# Patient Record
Sex: FEMALE | URBAN - METROPOLITAN AREA
[De-identification: names, ages, dates, MRNs, and addresses within clinical notes are randomized per-mention and may not be internally consistent; named-entity substitution may affect disease eponyms.]

---

## 2021-07-01 ENCOUNTER — ANESTHESIA EVENT (OUTPATIENT)
Dept: PERIOP | Facility: HOSPITAL | Age: 36
End: 2021-07-01
Payer: COMMERCIAL

## 2021-07-07 ENCOUNTER — ANESTHESIA (OUTPATIENT)
Dept: PERIOP | Facility: HOSPITAL | Age: 36
End: 2021-07-07
Payer: COMMERCIAL

## 2021-07-26 RX ORDER — MELATONIN
1000 DAILY
COMMUNITY

## 2021-07-26 RX ORDER — DIVALPROEX SODIUM 250 MG/1
250 TABLET, DELAYED RELEASE ORAL EVERY 12 HOURS SCHEDULED
COMMUNITY

## 2021-07-26 RX ORDER — NICOTINE POLACRILEX 2 MG
1 LOZENGE BUCCAL DAILY
COMMUNITY

## 2021-07-26 RX ORDER — TRAZODONE HYDROCHLORIDE 50 MG/1
50 TABLET ORAL
COMMUNITY

## 2021-07-26 RX ORDER — OXCARBAZEPINE 300 MG/1
300 TABLET, FILM COATED ORAL EVERY 12 HOURS SCHEDULED
COMMUNITY

## 2021-07-26 RX ORDER — RISPERIDONE 1 MG/ML
1.5 SOLUTION ORAL
COMMUNITY

## 2021-07-26 RX ORDER — ATOMOXETINE 40 MG/1
40 CAPSULE ORAL DAILY
COMMUNITY

## 2021-07-26 NOTE — PRE-PROCEDURE INSTRUCTIONS
My Surgical Experience    The following information was developed to assist you to prepare for your operation  What do I need to do before coming to the hospital?   Arrange for a responsible person to drive you to and from the hospital    Arrange care for your children at home  Children are not allowed in the recovery areas of the hospital   Plan to wear clothing that is easy to put on and take off  If you are having shoulder surgery, wear a shirt that buttons or zippers in the front  Bathing  o Shower the evening before and the morning of your surgery with an antibacterial soap  Please refer to the Pre Op Showering Instructions for Surgery Patients Sheet   o Remove nail polish and all body piercing jewelry  o Do not shave any body part for at least 24 hours before surgery-this includes face, arms, legs and upper body  Food  o Nothing to eat or drink after midnight the night before your surgery  This includes candy and chewing gum  o Exception: If your surgery is after 12:00pm (noon), you may have clear liquids such as 7-Up®, ginger ale, apple or cranberry juice, Jell-O®, water, or clear broth until 8:00 am  o Do not drink milk or juice with pulp on the morning before surgery  o Do not drink alcohol 24 hours before surgery  Medicine  o Follow instructions you received from your surgeon about which medicines you may take on the day of surgery  o If instructed to take medicine on the morning of surgery, take pills with just a small sip of water  Call your prescribing doctor for specific infroamtion on what to do if you take insulin    What should I bring to the hospital?    Bring:  Juliana Louie or a walker, if you have them, for foot or knee surgery   A list of the daily medicines, vitamins, minerals, herbals and nutritional supplements you take   Include the dosages of medicines and the time you take them each day   Glasses, dentures or hearing aids   Minimal clothing; you will be wearing hospital sleepwear   Photo ID; required to verify your identity   If you have a Living Will or Power of , bring a copy of the documents   If you have an ostomy, bring an extra pouch and any supplies you use    Do not bring   Medicines or inhalers   Money, valuables or jewelry    What other information should I know about the day of surgery?  Notify your surgeons if you develop a cold, sore throat, cough, fever, rash or any other illness   Report to the Ambulatory Surgical/Same Day Surgery Unit   You will be instructed to stop at Registration only if you have not been pre-registered   Inform your  fi they do not stay that they will be asked by the staff to leave a phone number where they can be reached   Be available to be reached before surgery  In the event the operating room schedule changes, you may be asked to come in earlier or later than expected    *It is important to tell your doctor and others involved in your health care if you are taking or have been taking any non-prescription drugs, vitamins, minerals, herbals or other nutritional supplements  Any of these may interact with some food or medicines and cause a reaction      Pre-Surgery Instructions:   Medication Instructions    atoMOXetine (STRATTERA) 40 mg capsule Instructed patient per Anesthesia Guidelines   cholecalciferol (VITAMIN D3) 1,000 units tablet Instructed patient per Anesthesia Guidelines   divalproex sodium (DEPAKOTE) 250 mg EC tablet Instructed patient per Anesthesia Guidelines   multivitamin-iron-minerals-folic acid (THERAPEUTIC-M) TABS tablet Instructed patient per Anesthesia Guidelines   OXcarbazepine (TRILEPTAL) 300 mg tablet Instructed patient per Anesthesia Guidelines   risperiDONE (RisperDAL) 1 mg/mL oral solution Instructed patient per Anesthesia Guidelines   traZODone (DESYREL) 50 mg tablet Instructed patient per Anesthesia Guidelines      triazolam (HALCION) 0 25 MG tablet Instructed patient per Anesthesia Guidelines  To take depakote, trileptal and triazolam a m  of surgery

## 2021-07-28 ENCOUNTER — HOSPITAL ENCOUNTER (OUTPATIENT)
Facility: HOSPITAL | Age: 36
Setting detail: OUTPATIENT SURGERY
Discharge: OTHER INSTITUTION | End: 2021-07-28
Attending: DENTIST | Admitting: DENTIST
Payer: COMMERCIAL

## 2021-07-28 ENCOUNTER — APPOINTMENT (OUTPATIENT)
Dept: RADIOLOGY | Facility: HOSPITAL | Age: 36
End: 2021-07-28
Payer: COMMERCIAL

## 2021-07-28 VITALS
TEMPERATURE: 97.7 F | HEART RATE: 88 BPM | HEIGHT: 60 IN | OXYGEN SATURATION: 99 % | WEIGHT: 100 LBS | RESPIRATION RATE: 20 BRPM | SYSTOLIC BLOOD PRESSURE: 108 MMHG | DIASTOLIC BLOOD PRESSURE: 52 MMHG | BODY MASS INDEX: 19.63 KG/M2

## 2021-07-28 PROBLEM — R56.9 SEIZURES (HCC): Status: ACTIVE | Noted: 2021-07-28

## 2021-07-28 RX ORDER — CHLORHEXIDINE GLUCONATE 0.12 MG/ML
RINSE ORAL AS NEEDED
Status: DISCONTINUED | OUTPATIENT
Start: 2021-07-28 | End: 2021-07-28 | Stop reason: HOSPADM

## 2021-07-28 RX ORDER — MAGNESIUM HYDROXIDE 1200 MG/15ML
LIQUID ORAL AS NEEDED
Status: DISCONTINUED | OUTPATIENT
Start: 2021-07-28 | End: 2021-07-28 | Stop reason: HOSPADM

## 2021-07-28 RX ORDER — PROPOFOL 10 MG/ML
INJECTION, EMULSION INTRAVENOUS AS NEEDED
Status: DISCONTINUED | OUTPATIENT
Start: 2021-07-28 | End: 2021-07-28

## 2021-07-28 RX ORDER — SODIUM CHLORIDE, SODIUM LACTATE, POTASSIUM CHLORIDE, CALCIUM CHLORIDE 600; 310; 30; 20 MG/100ML; MG/100ML; MG/100ML; MG/100ML
75 INJECTION, SOLUTION INTRAVENOUS CONTINUOUS
Status: DISCONTINUED | OUTPATIENT
Start: 2021-07-28 | End: 2021-07-28

## 2021-07-28 RX ORDER — ROCURONIUM BROMIDE 10 MG/ML
INJECTION, SOLUTION INTRAVENOUS AS NEEDED
Status: DISCONTINUED | OUTPATIENT
Start: 2021-07-28 | End: 2021-07-28

## 2021-07-28 RX ORDER — ONDANSETRON 2 MG/ML
4 INJECTION INTRAMUSCULAR; INTRAVENOUS ONCE AS NEEDED
Status: DISCONTINUED | OUTPATIENT
Start: 2021-07-28 | End: 2021-07-28 | Stop reason: HOSPADM

## 2021-07-28 RX ORDER — CLINDAMYCIN PHOSPHATE 600 MG/50ML
600 INJECTION INTRAVENOUS ONCE
Status: COMPLETED | OUTPATIENT
Start: 2021-07-28 | End: 2021-07-28

## 2021-07-28 RX ORDER — ONDANSETRON 2 MG/ML
INJECTION INTRAMUSCULAR; INTRAVENOUS AS NEEDED
Status: DISCONTINUED | OUTPATIENT
Start: 2021-07-28 | End: 2021-07-28

## 2021-07-28 RX ORDER — NEOSTIGMINE METHYLSULFATE 1 MG/ML
INJECTION INTRAVENOUS AS NEEDED
Status: DISCONTINUED | OUTPATIENT
Start: 2021-07-28 | End: 2021-07-28

## 2021-07-28 RX ORDER — SODIUM CHLORIDE, SODIUM LACTATE, POTASSIUM CHLORIDE, CALCIUM CHLORIDE 600; 310; 30; 20 MG/100ML; MG/100ML; MG/100ML; MG/100ML
100 INJECTION, SOLUTION INTRAVENOUS CONTINUOUS
Status: DISCONTINUED | OUTPATIENT
Start: 2021-07-28 | End: 2021-07-28 | Stop reason: HOSPADM

## 2021-07-28 RX ORDER — GLYCOPYRROLATE 0.2 MG/ML
INJECTION INTRAMUSCULAR; INTRAVENOUS AS NEEDED
Status: DISCONTINUED | OUTPATIENT
Start: 2021-07-28 | End: 2021-07-28

## 2021-07-28 RX ADMIN — NEOSTIGMINE METHYLSULFATE 3 MG: 1 INJECTION INTRAVENOUS at 08:32

## 2021-07-28 RX ADMIN — SODIUM CHLORIDE, SODIUM LACTATE, POTASSIUM CHLORIDE, AND CALCIUM CHLORIDE: .6; .31; .03; .02 INJECTION, SOLUTION INTRAVENOUS at 07:35

## 2021-07-28 RX ADMIN — PROPOFOL 50 MG: 10 INJECTION, EMULSION INTRAVENOUS at 07:44

## 2021-07-28 RX ADMIN — ONDANSETRON 4 MG: 2 INJECTION INTRAMUSCULAR; INTRAVENOUS at 07:58

## 2021-07-28 RX ADMIN — ROCURONIUM BROMIDE 30 MG: 10 INJECTION, SOLUTION INTRAVENOUS at 07:44

## 2021-07-28 RX ADMIN — GLYCOPYRROLATE 0.4 MG: 0.2 INJECTION, SOLUTION INTRAMUSCULAR; INTRAVENOUS at 08:32

## 2021-07-28 RX ADMIN — CLINDAMYCIN PHOSPHATE 600 MG: 600 INJECTION, SOLUTION INTRAVENOUS at 07:44

## 2021-07-28 NOTE — OP NOTE
COMPLETE ORAL REHABILITATION  DENTAL EXAM  FULL-MOUTH DENTAL XRAYS  PLANING & SCALING  AMALGAM RESTORATIONS: #20-MO, #29-DO  PROPHYLAXIS  FLOURIDE  Procedure Note    Dianna Mansfield  7/28/2021    Pre-op Diagnosis:   Unspecified intellectual disabilities [F79]  Chronic periodontitis, unspecified [K05 30]       Post-Op Diagnosis Codes:     * Unspecified intellectual disabilities [F79]     * Chronic periodontitis, unspecified [K05 30]     * Dental caries extending into dentin [K02 62]     * Situational anxiety [F41 8]    Procedure(s):  COMPLETE ORAL REHABILITATION  DENTAL EXAM  FULL-MOUTH DENTAL XRAYS  PLANING & SCALING  AMALGAM RESTORATIONS: #20-MO, #29-DO  PROPHYLAXIS  FLOURIDE  Surgeon(s) and Role:     * Sonia Montes, DENISE - Primary     * Madelin Brochure, DMD - Assisting     Anesthesia: General    Staff:   Circulator: Bridger Mcghee RN  Scrub Person: Excell [place]  No qualified Resident was available, Resident is only observing    Estimated Blood Loss: Minimal    Specimens:                Order Name Source Comment Collection Info Order Time   PREGNANCY TEST (HCG QUALITATIVE)    7/28/2021  5:49 AM       Drains:  None      Procedure:  After the induction of IV inhalation anesthesia with nasal tracheal intubation the patient was prepped and draped for intraoral dental procedure  A time-out identifying the patient and procedure was completed  Eleven digital periapical radiographs were taken and processed  The posterior pharyngeal pack was inserted using cotton gauze with details  Visual examination of the patient's oral cavity and dentition was accomplished and related to the radiograph findings  Extensive plaque and calculus deposits present  Gingival tissues were erythematous and edematous  Dental caries was detected with a number 23 explore on teeth numbers 20 and 29  After the removal of dental caries and preparation of teeth the following amount amalgams were placed; 20 MO and 29 DO    Scaling and prophylaxis was completed  Application of fluoride varnish to all teeth  The oral cavity was then irrigated and suctioned  The posterior or pharyngeal pack was removed and the oral pharyngeal area was suction  Patient left the operating room in stable condition and was transported to the post anesthesia care unit        Complications:  None      Clemente Mebmreno DMD     Date: 7/28/2021  Time: 9:07 AM

## 2021-07-28 NOTE — DISCHARGE SUMMARY
Discharge Summary - Simón Leija 31 y o  female MRN: [id number]    Unit/Bed#: [facility] Encounter: [id number]    Admission Date: 7/28/2021     Admitting Diagnosis: Unspecified intellectual disabilities [F79]  Chronic periodontitis, unspecified [K05 30]      Procedures Performed: No orders of the defined types were placed in this encounter  Complications: none    Discharge Diagnosis: Post-Op Diagnosis Codes:     * Unspecified intellectual disabilities [F79]     * Chronic periodontitis, unspecified [K05 30]     * Dental caries extending into dentin [K02 62]     * Situational anxiety [F41 8]    Condition at Discharge: good     Discharge instructions/Information to patient and family:   See after visit summary for information provided to patient and family  Provisions for Follow-Up Care:  See after visit summary for information related to follow-up care and any pertinent home health orders  Disposition: See After Visit Summary for discharge disposition information  Discharge Statement   I spent on the day of discharge  I had direct contact with the patient on the day of discharge  Additional documentation is required if more than 30 minutes were spent on discharge  Discharge Medications:  See after visit summary for reconciled discharge medications provided to patient and family

## 2021-07-28 NOTE — ANESTHESIA POSTPROCEDURE EVALUATION
Post-Op Assessment Note    CV Status:  Stable  Pain Score: 0    Pain management: adequate     Mental Status:  Sleepy and arousable   Hydration Status:  Stable   PONV Controlled:  Controlled   Airway Patency:  Patent and adequate      Post Op Vitals Reviewed: Yes      Staff: CRNA         No complications documented      BP      Temp      Pulse     Resp      SpO2

## 2021-07-28 NOTE — PERIOPERATIVE NURSING NOTE
Pt received from PACU in [facility]6  Pt is nonverbal but displays no signs of nausea or pain  Operative site is intact, VSS, call bell present and side rails up  Upon arrival no guardians were in the facility and I waited with the patient until they arrived in 15 minutes  Pt was able to tolerate fluid  Discharge criteria met and pt is being taken by wheelchair to group home vehicle accompanied by transporter, mom and an aid

## 2021-07-28 NOTE — INTERVAL H&P NOTE
H&P reviewed  After examining the patient I find no changes in the patients condition since the H&P had been written      Vitals:    07/28/21 0607   BP: 167/84   Pulse: 98   Resp: 16   Temp: 97 7 °F (36 5 °C)   SpO2: 99%

## 2025-01-02 RX ORDER — CLOBAZAM 2.5 MG/ML
10 SUSPENSION ORAL 2 TIMES DAILY
COMMUNITY

## 2025-01-02 RX ORDER — VALPROIC ACID 250 MG/5ML
12.5 SOLUTION ORAL EVERY 12 HOURS
COMMUNITY

## 2025-01-02 RX ORDER — MIDAZOLAM 5 MG/.1ML
2 SPRAY NASAL ONCE AS NEEDED
COMMUNITY

## 2025-01-02 RX ORDER — RISPERIDONE 2 MG/1
2 TABLET ORAL
COMMUNITY

## 2025-01-02 NOTE — PRE-PROCEDURE INSTRUCTIONS
Pre-Surgery Instructions:   Medication Instructions    cloBAZam (ONFI) oral suspension Take day of surgery.    docusate (COLACE) 50 mg/5 mL liquid Take day of surgery.    FUROSEMIDE PO Hold day of surgery.    Midazolam (Nayzilam) 5 MG/0.1ML SOLN Uses PRN- OK to take day of surgery    risperiDONE (RisperDAL) 2 mg tablet Take night before surgery    valproic acid (DEPAKENE) 250 MG/5ML soln Take day of surgery.    Medication instructions for day surgery reviewed. Please use only a sip of water to take your instructed medications. Avoid all over the counter vitamins, supplements and NSAIDS for one week prior to surgery per anesthesia guidelines. Tylenol is ok to take as needed.   Take morning medications with up to 6 oz of water or jello only at least 2 hours prior to arrival time. NO applesauce or pudding. Otherwise no food or drink, including water after midnight Thursday.  You will receive a call one business day prior to surgery with an arrival time and hospital directions. If your surgery is scheduled on a Monday, the hospital will be calling you on the Friday prior to your surgery. If you have not heard from anyone by 8pm, please call the hospital supervisor through the hospital  at [phone]. ([place] [phone] or [place] [phone]).    Do not eat or drink anything after midnight the night before your surgery, including candy, mints, lifesavers, or chewing gum. Do not drink alcohol 24hrs before your surgery.      Follow the pre surgery showering instructions as listed in the “My Surgical Experience Booklet” or otherwise provided by your surgeon's office. Do not use a blade to shave the surgical area 1 week before surgery. It is okay to use a clean electric clippers up to 24 hours before surgery. Do not apply any lotions, creams, including makeup, cologne, deodorant, or perfumes after showering on the day of your surgery. Do not use dry shampoo, hair spray, hair gel, or any type of hair  products.     No contact lenses, eye make-up, or artificial eyelashes. Remove nail polish, including gel polish, and any artificial, gel, or acrylic nails if possible. Remove all jewelry including rings and body piercing jewelry.     Wear causal clothing that is easy to take on and off. Consider your type of surgery.    Keep any valuables, jewelry, piercings at home. Please bring any specially ordered equipment (sling, braces) if indicated.    Arrange for a responsible person to drive you to and from the hospital on the day of your surgery. Please confirm the visitor policy for the day of your procedure when you receive your phone call with an arrival time.     Call the surgeon's office with any new illnesses, exposures, or additional questions prior to surgery.    Please reference your “My Surgical Experience Booklet” for additional information to prepare for your upcoming surgery.

## 2025-01-14 ENCOUNTER — APPOINTMENT (OUTPATIENT)
Dept: PREADMISSION TESTING | Facility: HOSPITAL | Age: 40
End: 2025-01-14
Payer: COMMERCIAL

## 2025-01-17 ENCOUNTER — ANESTHESIA EVENT (OUTPATIENT)
Dept: PERIOP | Facility: HOSPITAL | Age: 40
End: 2025-01-17
Payer: COMMERCIAL

## 2025-01-17 ENCOUNTER — HOSPITAL ENCOUNTER (OUTPATIENT)
Facility: HOSPITAL | Age: 40
Setting detail: OUTPATIENT SURGERY
Discharge: NURSING, REHAB OR LONG-TERM CARE | End: 2025-01-17
Attending: DENTIST | Admitting: DENTIST
Payer: COMMERCIAL

## 2025-01-17 ENCOUNTER — ANESTHESIA (OUTPATIENT)
Dept: PERIOP | Facility: HOSPITAL | Age: 40
End: 2025-01-17
Payer: COMMERCIAL

## 2025-01-17 ENCOUNTER — APPOINTMENT (OUTPATIENT)
Dept: RADIOLOGY | Facility: HOSPITAL | Age: 40
End: 2025-01-17
Payer: COMMERCIAL

## 2025-01-17 VITALS
HEART RATE: 88 BPM | TEMPERATURE: 97.5 F | DIASTOLIC BLOOD PRESSURE: 72 MMHG | RESPIRATION RATE: 17 BRPM | SYSTOLIC BLOOD PRESSURE: 116 MMHG | WEIGHT: 122 LBS | BODY MASS INDEX: 26.32 KG/M2 | HEIGHT: 57 IN | OXYGEN SATURATION: 97 %

## 2025-01-17 RX ORDER — BUPIVACAINE HYDROCHLORIDE AND EPINEPHRINE 5; 5 MG/ML; UG/ML
INJECTION, SOLUTION PERINEURAL AS NEEDED
Status: DISCONTINUED | OUTPATIENT
Start: 2025-01-17 | End: 2025-01-17 | Stop reason: HOSPADM

## 2025-01-17 RX ORDER — MAGNESIUM HYDROXIDE 1200 MG/15ML
LIQUID ORAL AS NEEDED
Status: DISCONTINUED | OUTPATIENT
Start: 2025-01-17 | End: 2025-01-17 | Stop reason: HOSPADM

## 2025-01-17 RX ORDER — CHLORHEXIDINE GLUCONATE ORAL RINSE 1.2 MG/ML
SOLUTION DENTAL AS NEEDED
Status: DISCONTINUED | OUTPATIENT
Start: 2025-01-17 | End: 2025-01-17 | Stop reason: HOSPADM

## 2025-01-17 RX ORDER — ROCURONIUM BROMIDE 10 MG/ML
INJECTION, SOLUTION INTRAVENOUS AS NEEDED
Status: DISCONTINUED | OUTPATIENT
Start: 2025-01-17 | End: 2025-01-17

## 2025-01-17 RX ORDER — PROPOFOL 10 MG/ML
INJECTION, EMULSION INTRAVENOUS AS NEEDED
Status: DISCONTINUED | OUTPATIENT
Start: 2025-01-17 | End: 2025-01-17

## 2025-01-17 RX ORDER — ONDANSETRON 2 MG/ML
INJECTION INTRAMUSCULAR; INTRAVENOUS AS NEEDED
Status: DISCONTINUED | OUTPATIENT
Start: 2025-01-17 | End: 2025-01-17

## 2025-01-17 RX ORDER — CLINDAMYCIN PHOSPHATE 600 MG/50ML
600 INJECTION, SOLUTION INTRAVENOUS
Status: CANCELLED | OUTPATIENT
Start: 2025-01-17

## 2025-01-17 RX ORDER — SODIUM CHLORIDE, SODIUM LACTATE, POTASSIUM CHLORIDE, CALCIUM CHLORIDE 600; 310; 30; 20 MG/100ML; MG/100ML; MG/100ML; MG/100ML
INJECTION, SOLUTION INTRAVENOUS CONTINUOUS PRN
Status: DISCONTINUED | OUTPATIENT
Start: 2025-01-17 | End: 2025-01-17

## 2025-01-17 RX ORDER — CLINDAMYCIN PHOSPHATE 600 MG/50ML
600 INJECTION, SOLUTION INTRAVENOUS
Status: DISCONTINUED | OUTPATIENT
Start: 2025-01-17 | End: 2025-01-17 | Stop reason: HOSPADM

## 2025-01-17 RX ORDER — DEXAMETHASONE SODIUM PHOSPHATE 10 MG/ML
INJECTION, SOLUTION INTRAMUSCULAR; INTRAVENOUS AS NEEDED
Status: DISCONTINUED | OUTPATIENT
Start: 2025-01-17 | End: 2025-01-17

## 2025-01-17 RX ADMIN — PROPOFOL 50 MG: 10 INJECTION, EMULSION INTRAVENOUS at 10:25

## 2025-01-17 RX ADMIN — CLINDAMYCIN PHOSPHATE 600 MG: 600 INJECTION, SOLUTION INTRAVENOUS at 10:32

## 2025-01-17 RX ADMIN — ONDANSETRON 4 MG: 2 INJECTION INTRAMUSCULAR; INTRAVENOUS at 10:40

## 2025-01-17 RX ADMIN — ROCURONIUM BROMIDE 20 MG: 10 INJECTION, SOLUTION INTRAVENOUS at 10:26

## 2025-01-17 RX ADMIN — DEXAMETHASONE SODIUM PHOSPHATE 10 MG: 10 INJECTION, SOLUTION INTRAMUSCULAR; INTRAVENOUS at 10:40

## 2025-01-17 RX ADMIN — PHENYLEPHRINE HYDROCHLORIDE 3 DROP: 1 SPRAY NASAL at 10:25

## 2025-01-17 RX ADMIN — SODIUM CHLORIDE, SODIUM LACTATE, POTASSIUM CHLORIDE, AND CALCIUM CHLORIDE: .6; .31; .03; .02 INJECTION, SOLUTION INTRAVENOUS at 10:25

## 2025-01-17 RX ADMIN — SUGAMMADEX 100 MG: 100 INJECTION, SOLUTION INTRAVENOUS at 11:34

## 2025-01-17 NOTE — ANESTHESIA PREPROCEDURE EVALUATION
Procedure:  COMPLETE ORAL REHABILITATION (Mouth)    Relevant Problems   NEURO/PSYCH   (+) Seizures (HCC)        Physical Exam    Airway    Mallampati score: II  TM Distance: >3 FB  Neck ROM: full     Dental   No notable dental hx     Cardiovascular  Cardiovascular exam normal    Pulmonary  Pulmonary exam normal     Other Findings  post-pubertal.      Anesthesia Plan  ASA Score- 2     Anesthesia Type- general with ASA Monitors.         Additional Monitors:     Airway Plan: NTT.           Plan Factors-Exercise tolerance (METS): >4 METS.    Chart reviewed.   Existing labs reviewed. Patient summary reviewed.    Patient is not a current smoker.              Induction- intravenous.    Postoperative Plan- Plan for postoperative opioid use.     Perioperative Resuscitation Plan - Level 1 - Full Code.       Informed Consent- Anesthetic plan and risks discussed with legal guardian.  I personally reviewed this patient with the CRNA. Discussed and agreed on the Anesthesia Plan with the CRNA..      NPO Status:  Vitals Value Taken Time   Date of last liquid 01/17/25 01/17/25 0914   Time of last liquid 0720 01/17/25 0914   Date of last solid 01/16/25 01/17/25 0914   Time of last solid 1700 01/17/25 0914

## 2025-01-17 NOTE — NURSING NOTE
Pt has periods; unable to obtain urine sample bc pt is incontinent. Dr. Flores made aware and at bedside. Mother/POA, Sheila Maya, at bedside and stated she is okay to waive preg test.

## 2025-01-17 NOTE — OP NOTE
COMPLETE ORAL REHABILITATION, DENTAL EXAM, FULL MOUTH XRAYS, PLANING AND SCALING, GINGIVECTOMY, PERIO CHARTING, PROPHYLAXIS, D/SENSE, FLUORIDE, COMPOSITE #9 MFD, COMPOSITE #10 MFD, COMPOSITE #22 MFD, COMPOSITE #27 MFD, COMEPOSITE #28 B, COMPOSITE #3 OL, #29 SURGICAL EXTRACTION, #20 SURGICAL EXTRACTION  Postoperative Note  PATIENT NAME: Tiffanie Diaz  : 1985  MRN: [id number]  [facility] ROOM 02    Surgery Date: 2025    Unspecified intellectual disabilities [F79]  Chronic periodontitis, unspecified [K05.30]    Post-Op Diagnosis Codes:     * Unspecified intellectual disabilities [F79]     * Chronic periodontitis, unspecified [K05.30]    Procedure(s):  COMPLETE ORAL REHABILITATION, DENTAL EXAM, FULL MOUTH XRAYS, PLANING AND SCALING, GINGIVECTOMY, PERIO CHARTING, PROPHYLAXIS, D/SENSE, FLUORIDE, COMPOSITE #9 MFD, COMPOSITE #10 MFD, COMPOSITE #22 MFD, COMPOSITE #27 MFD, COMEPOSITE #28 B, COMPOSITE #3 OL, #29 SURGICAL EXTRACTION, #20 SURGICAL EXTRACTION    Surgeons and Role:     * Benito Wahl DMD - Primary    Specimens:  none    Estimated Blood Loss:   Minimal    Anesthesia Type:   General     Operative report:            Complications:   None    SIGNATURE: Benito Wahl DMD   DATE: 2025   TIME: 11:38 AMOPERATIVE REPORT  PATIENT NAME: Tiffanie Diaz    :  1985  MRN: [id number]  Pt Location: [place] [address]    SURGERY DATE: 2025    Surgeons and Role:     * Benito Wahl DMD - Primary    Preop Diagnosis:  Unspecified intellectual disabilities [F79]  Chronic periodontitis, unspecified [K05.30]    Post-Op Diagnosis Codes:     * Unspecified intellectual disabilities [F79]     * Chronic periodontitis, unspecified [K05.30]    Procedure(s):  COMPLETE ORAL REHABILITATION. DENTAL EXAM. FULL MOUTH XRAYS. PLANING AND SCALING. GINGIVECTOMY. PERIO CHARTING. PROPHYLAXIS. D/SENSE. FLUORIDE. COMPOSITE #9 MFD. COMPOSITE #10 MFD. COMPOSITE #22 MFD. COMPOSITE #27 MFD. COMEPOSITE #28 B. COMPOSITE  #3 OL. #29 SURGICAL EXTRACTION. #20 SURGICAL EXTRACTION    Specimen(s):  * No specimens in log *    Estimated Blood Loss:   Minimal    Drains:  * No LDAs found *    Anesthesia Type:   General    Operative Indications:  Unspecified intellectual disabilities [F79]  Chronic periodontitis, unspecified [K05.30]  Chronic adult periodontitis, dental caries, multiple abscessed teeth, malocclusion.    Operative Findings:        Complications:   None    Procedure and Technique:  Following the induction of IV inhalation anesthesia with nasotracheal intubation, this patient was prepped and draped for an intraoral dental surgical procedure.  14 periapical dental radiographs were exposed and stored.  Preliminary treatment plan formulated at that time.  Following insertion of a posterior pharyngeal pack with Ray-Kartik gauze single tail moistened debridement of the dentition was completed utilizing ultrasonic scaling with the 30 KH Z Cavitron unit.  Both large and slim tip inserts were utilized.  Hand-held curettes and 's use were appropriate.  Comprehensive evaluation the patient's oral cavity and teeth was then completed.  This information was related to the aforementioned radiographic survey whereby definitive treatment plan was then set forth.  Following administration of 9.0 mL of Marcaine 0.5% epi concentration 1-200,000 patient had gingivectomy surgery in all 4 quadrants.  Upper right, upper left, lower left, lower right.  The Bovie electrocautery unit was utilized the setting was #25 for both cutting and coagulation.  [place] tip-117 was utilized and hemostasis achieved in a coagulative modality.  Attention was then directed to carious teeth.  These were restored using composite resin material Shade a 3.5.  Tooth #9 mesial facial and distal surfaces, tooth #10 mesial facial and distal surfaces, tooth #22 mesial facial and distal surfaces, tooth #27 mesial facial and distal surfaces tooth #28 buccal surface, tooth #3  occlusal lingual surfaces.  Attention was then directed to abscessed and nonrestorable teeth numbers 20 and 29 both these teeth required surgical extraction impact drill with straight fissured surgical bur was used to remove interseptal bone medially and distally to both roots.  Numbers 20 and #29 were removed in total.  The areas were curetted Gelfoam was placed and six 3-0 Vicryl sutures were used for closure.  This patient also had adult prophylaxis, desensitizing and medicaments fluoride treatment performed for all remaining dentition.  This patient did receive 600 mg of Cleocin intravenously during the surgery.  The posterior pharyngeal pack was removed and the oropharyngeal area suction irrigated with sterile saline in usual manner.  The patient left the operating room in satisfactory condition and was transported to the PACU without incident.   I was present for the entire procedure.    Patient Disposition:  PACU              SIGNATURE: Benito Wahl DMD  DATE: January 17, 2025  TIME: 11:38 AM

## 2025-01-17 NOTE — NURSING NOTE
Patient returned from PACU alert. Scant drainage note. Pt is relaxed, no signs of distress. Call bell within reach, bed in lowest position, side rails up, call bell in reach of mother and caregiver (pt nonverbal), beverage at bedside. Per mother and caregiver, no questions or concerns at this time.

## 2025-01-17 NOTE — PERIOPERATIVE NURSING NOTE
Patient alert and awake, vital signs WNL. Patient transferred to APU via stretcher. Bedside report given to LAZARO Almazan. Stretcher locked in lowest position, side rails up, call bell within reach.

## 2025-01-17 NOTE — NURSING NOTE
22G IV removed from lt arm.    Patient discharged home. D/C instructions reviewed with mother and caregiver. No signs of distress noted in patient. Per mother and caregiver, no questions or concerns at this time. Advised to contact Dr. Wahl's office with any questions or concerns once home.
